# Patient Record
Sex: MALE | Race: WHITE | URBAN - METROPOLITAN AREA
[De-identification: names, ages, dates, MRNs, and addresses within clinical notes are randomized per-mention and may not be internally consistent; named-entity substitution may affect disease eponyms.]

---

## 2024-05-21 RX ORDER — ALFUZOSIN HYDROCHLORIDE 10 MG/1
10 TABLET, EXTENDED RELEASE ORAL NIGHTLY
COMMUNITY

## 2024-05-21 RX ORDER — BACLOFEN 20 MG/1
20 TABLET ORAL NIGHTLY
COMMUNITY

## 2024-05-21 RX ORDER — LEVOTHYROXINE SODIUM 0.03 MG/1
25 TABLET ORAL DAILY
COMMUNITY

## 2024-05-21 RX ORDER — ASPIRIN 81 MG/1
81 TABLET, CHEWABLE ORAL DAILY
COMMUNITY
End: 2024-05-21

## 2024-05-21 RX ORDER — VIBEGRON 75 MG/1
75 TABLET, FILM COATED ORAL DAILY
COMMUNITY
End: 2024-05-21

## 2024-05-21 RX ORDER — ASPIRIN 325 MG
325 TABLET ORAL DAILY
COMMUNITY

## 2024-05-21 NOTE — PROGRESS NOTES
[facility] PRE-ADMISSION TESTING INSTRUCTIONS    The Preadmission Testing patient is instructed accordingly using the following criteria (check applicable):    ARRIVAL INSTRUCTIONS:  [x] Parking the day of Surgery is located in the Main Entrance lot.  Upon entering the door, make an immediate right to the surgery reception desk    [x] Bring photo ID and insurance card    [] Bring in a copy of Living will or Durable Power of  papers.    [x] Please be sure to arrange for responsible adult to provide transportation to and from the hospital    [x] Please arrange for responsible adult to be with you for the 24 hour period post procedure due to having anesthesia    [x] If you awake am of surgery not feeling well or have temperature >100 please call [phone]    GENERAL INSTRUCTIONS:    [x] Nothing by mouth after midnight, including gum, candy, mints or water    [x] You may brush your teeth, but do not swallow any water    [] Take medications as instructed with 1-2 oz of water    [x] Stop herbal supplements and vitamins 5 days prior to procedure    [x] Follow preop dosing of blood thinners per physician instructions    [] Take 1/2 dose of evening insulin, but no insulin after midnight    [] No oral diabetic medications after midnight    [] If diabetic and have low blood sugar or feel symptomatic, take 1-2oz apple juice only    [] Bring inhalers day of surgery    [] Bring C-PAP/ Bi-Pap day of surgery    [] Bring urine specimen day of surgery    [x] Shower or bath with soap, lather and rinse well, AM of Surgery, no lotion, powders or creams to surgical site    [] Follow bowel prep as instructed per surgeon    [x] No tobacco products within 24 hours of surgery     [x] No alcohol or illegal drug use within 24 hours of surgery.    [x] Jewelry, body piercing's, eyeglasses, contact lenses and dentures are not permitted into surgery (bring cases)      [] Please do not wear any nail polish,

## 2024-05-22 ENCOUNTER — PREP FOR PROCEDURE (OUTPATIENT)
Dept: UROLOGY | Age: 69
End: 2024-05-22

## 2024-05-22 RX ORDER — SODIUM CHLORIDE 9 MG/ML
INJECTION, SOLUTION INTRAVENOUS CONTINUOUS
Status: CANCELLED | OUTPATIENT
Start: 2024-05-22

## 2024-05-22 RX ORDER — SODIUM CHLORIDE 0.9 % (FLUSH) 0.9 %
5-40 SYRINGE (ML) INJECTION EVERY 12 HOURS SCHEDULED
Status: CANCELLED | OUTPATIENT
Start: 2024-05-22

## 2024-05-22 RX ORDER — SODIUM CHLORIDE 9 MG/ML
INJECTION, SOLUTION INTRAVENOUS PRN
Status: CANCELLED | OUTPATIENT
Start: 2024-05-22

## 2024-05-22 RX ORDER — SODIUM CHLORIDE 0.9 % (FLUSH) 0.9 %
5-40 SYRINGE (ML) INJECTION PRN
Status: CANCELLED | OUTPATIENT
Start: 2024-05-22

## 2024-05-23 ENCOUNTER — ANESTHESIA EVENT (OUTPATIENT)
Dept: OPERATING ROOM | Age: 69
End: 2024-05-23
Payer: MEDICARE

## 2024-05-23 ENCOUNTER — HOSPITAL ENCOUNTER (INPATIENT)
Age: 69
LOS: 1 days | Discharge: HOME | DRG: 694 | End: 2024-05-24
Attending: UROLOGY | Admitting: UROLOGY
Payer: MEDICARE

## 2024-05-23 ENCOUNTER — ANESTHESIA (OUTPATIENT)
Dept: OPERATING ROOM | Age: 69
End: 2024-05-23
Payer: MEDICARE

## 2024-05-23 ENCOUNTER — APPOINTMENT (OUTPATIENT)
Dept: GENERAL RADIOLOGY | Age: 69
DRG: 694 | End: 2024-05-23
Attending: UROLOGY
Payer: MEDICARE

## 2024-05-23 DIAGNOSIS — R31.0 GROSS HEMATURIA: ICD-10-CM

## 2024-05-23 DIAGNOSIS — Z48.89 POSTOPERATIVE OBSERVATION: ICD-10-CM

## 2024-05-23 DIAGNOSIS — N21.0 BLADDER STONE: Primary | ICD-10-CM

## 2024-05-23 DIAGNOSIS — N21.0 CALCULUS IN DIVERTICULUM OF BLADDER: ICD-10-CM

## 2024-05-23 PROCEDURE — 2580000003 HC RX 258: Performed by: UROLOGY

## 2024-05-23 PROCEDURE — 6370000000 HC RX 637 (ALT 250 FOR IP): Performed by: UROLOGY

## 2024-05-23 PROCEDURE — 2580000003 HC RX 258: Performed by: NURSE ANESTHETIST, CERTIFIED REGISTERED

## 2024-05-23 PROCEDURE — 7100000001 HC PACU RECOVERY - ADDTL 15 MIN: Performed by: UROLOGY

## 2024-05-23 PROCEDURE — 3700000000 HC ANESTHESIA ATTENDED CARE: Performed by: UROLOGY

## 2024-05-23 PROCEDURE — 6360000002 HC RX W HCPCS: Performed by: UROLOGY

## 2024-05-23 PROCEDURE — BT141ZZ FLUOROSCOPY OF KIDNEYS, URETERS AND BLADDER USING LOW OSMOLAR CONTRAST: ICD-10-PCS | Performed by: UROLOGY

## 2024-05-23 PROCEDURE — 7100000011 HC PHASE II RECOVERY - ADDTL 15 MIN: Performed by: UROLOGY

## 2024-05-23 PROCEDURE — 3600000002 HC SURGERY LEVEL 2 BASE: Performed by: UROLOGY

## 2024-05-23 PROCEDURE — 6360000002 HC RX W HCPCS: Performed by: ANESTHESIOLOGY

## 2024-05-23 PROCEDURE — 2709999900 HC NON-CHARGEABLE SUPPLY: Performed by: UROLOGY

## 2024-05-23 PROCEDURE — 3600000012 HC SURGERY LEVEL 2 ADDTL 15MIN: Performed by: UROLOGY

## 2024-05-23 PROCEDURE — 88112 CYTOPATH CELL ENHANCE TECH: CPT

## 2024-05-23 PROCEDURE — 6360000002 HC RX W HCPCS

## 2024-05-23 PROCEDURE — 74420 UROGRAPHY RTRGR +-KUB: CPT

## 2024-05-23 PROCEDURE — 6360000002 HC RX W HCPCS: Performed by: NURSE ANESTHETIST, CERTIFIED REGISTERED

## 2024-05-23 PROCEDURE — 1200000000 HC SEMI PRIVATE

## 2024-05-23 PROCEDURE — 2580000003 HC RX 258

## 2024-05-23 PROCEDURE — G0378 HOSPITAL OBSERVATION PER HR: HCPCS

## 2024-05-23 PROCEDURE — 0TCB8ZZ EXTIRPATION OF MATTER FROM BLADDER, VIA NATURAL OR ARTIFICIAL OPENING ENDOSCOPIC: ICD-10-PCS | Performed by: UROLOGY

## 2024-05-23 PROCEDURE — 88300 SURGICAL PATH GROSS: CPT

## 2024-05-23 PROCEDURE — C1758 CATHETER, URETERAL: HCPCS | Performed by: UROLOGY

## 2024-05-23 PROCEDURE — 82365 CALCULUS SPECTROSCOPY: CPT

## 2024-05-23 PROCEDURE — 7100000010 HC PHASE II RECOVERY - FIRST 15 MIN: Performed by: UROLOGY

## 2024-05-23 PROCEDURE — 7100000000 HC PACU RECOVERY - FIRST 15 MIN: Performed by: UROLOGY

## 2024-05-23 PROCEDURE — 3700000001 HC ADD 15 MINUTES (ANESTHESIA): Performed by: UROLOGY

## 2024-05-23 PROCEDURE — 2060000000 HC ICU INTERMEDIATE R&B

## 2024-05-23 PROCEDURE — 2720000010 HC SURG SUPPLY STERILE: Performed by: UROLOGY

## 2024-05-23 PROCEDURE — 87086 URINE CULTURE/COLONY COUNT: CPT

## 2024-05-23 PROCEDURE — 2500000003 HC RX 250 WO HCPCS: Performed by: NURSE ANESTHETIST, CERTIFIED REGISTERED

## 2024-05-23 RX ORDER — DEXAMETHASONE SODIUM PHOSPHATE 4 MG/ML
INJECTION, SOLUTION INTRA-ARTICULAR; INTRALESIONAL; INTRAMUSCULAR; INTRAVENOUS; SOFT TISSUE PRN
Status: DISCONTINUED | OUTPATIENT
Start: 2024-05-23 | End: 2024-05-23 | Stop reason: SDUPTHER

## 2024-05-23 RX ORDER — DEXTROSE MONOHYDRATE 100 MG/ML
INJECTION, SOLUTION INTRAVENOUS CONTINUOUS PRN
Status: DISCONTINUED | OUTPATIENT
Start: 2024-05-23 | End: 2024-05-23 | Stop reason: HOSPADM

## 2024-05-23 RX ORDER — SODIUM CHLORIDE 9 MG/ML
INJECTION, SOLUTION INTRAVENOUS PRN
Status: DISCONTINUED | OUTPATIENT
Start: 2024-05-23 | End: 2024-05-23 | Stop reason: HOSPADM

## 2024-05-23 RX ORDER — BACLOFEN 10 MG/1
20 TABLET ORAL NIGHTLY
Status: DISCONTINUED | OUTPATIENT
Start: 2024-05-23 | End: 2024-05-24 | Stop reason: HOSPADM

## 2024-05-23 RX ORDER — CEPHALEXIN 500 MG/1
500 CAPSULE ORAL 2 TIMES DAILY
Qty: 10 CAPSULE | Refills: 0 | Status: SHIPPED | OUTPATIENT
Start: 2024-05-23 | End: 2024-05-28

## 2024-05-23 RX ORDER — OXYCODONE HYDROCHLORIDE AND ACETAMINOPHEN 5; 325 MG/1; MG/1
1 TABLET ORAL EVERY 6 HOURS PRN
Status: DISCONTINUED | OUTPATIENT
Start: 2024-05-23 | End: 2024-05-24 | Stop reason: HOSPADM

## 2024-05-23 RX ORDER — ONDANSETRON 4 MG/1
4 TABLET, FILM COATED ORAL EVERY 12 HOURS PRN
Qty: 10 TABLET | Refills: 0 | Status: SHIPPED | OUTPATIENT
Start: 2024-05-23

## 2024-05-23 RX ORDER — SODIUM CHLORIDE 9 MG/ML
INJECTION, SOLUTION INTRAVENOUS CONTINUOUS
Status: DISCONTINUED | OUTPATIENT
Start: 2024-05-23 | End: 2024-05-24 | Stop reason: HOSPADM

## 2024-05-23 RX ORDER — SODIUM CHLORIDE 9 MG/ML
INJECTION, SOLUTION INTRAVENOUS CONTINUOUS PRN
Status: DISCONTINUED | OUTPATIENT
Start: 2024-05-23 | End: 2024-05-23 | Stop reason: SDUPTHER

## 2024-05-23 RX ORDER — ONDANSETRON 2 MG/ML
4 INJECTION INTRAMUSCULAR; INTRAVENOUS EVERY 6 HOURS PRN
Status: DISCONTINUED | OUTPATIENT
Start: 2024-05-23 | End: 2024-05-24 | Stop reason: HOSPADM

## 2024-05-23 RX ORDER — HYDROCODONE BITARTRATE AND ACETAMINOPHEN 5; 325 MG/1; MG/1
1 TABLET ORAL EVERY 4 HOURS PRN
Qty: 10 TABLET | Refills: 0 | Status: SHIPPED | OUTPATIENT
Start: 2024-05-23 | End: 2024-05-28

## 2024-05-23 RX ORDER — PHENAZOPYRIDINE HYDROCHLORIDE 200 MG/1
200 TABLET, FILM COATED ORAL 2 TIMES DAILY PRN
Qty: 10 TABLET | Refills: 0 | Status: SHIPPED | OUTPATIENT
Start: 2024-05-23

## 2024-05-23 RX ORDER — LEVOTHYROXINE SODIUM 0.03 MG/1
25 TABLET ORAL DAILY
Status: DISCONTINUED | OUTPATIENT
Start: 2024-05-23 | End: 2024-05-24 | Stop reason: HOSPADM

## 2024-05-23 RX ORDER — DIPHENHYDRAMINE HYDROCHLORIDE 50 MG/ML
12.5 INJECTION INTRAMUSCULAR; INTRAVENOUS
Status: DISCONTINUED | OUTPATIENT
Start: 2024-05-23 | End: 2024-05-23 | Stop reason: HOSPADM

## 2024-05-23 RX ORDER — SODIUM CHLORIDE 9 MG/ML
INJECTION, SOLUTION INTRAVENOUS CONTINUOUS
Status: DISCONTINUED | OUTPATIENT
Start: 2024-05-23 | End: 2024-05-23 | Stop reason: HOSPADM

## 2024-05-23 RX ORDER — SODIUM CHLORIDE 0.9 % (FLUSH) 0.9 %
5-40 SYRINGE (ML) INJECTION EVERY 12 HOURS SCHEDULED
Status: DISCONTINUED | OUTPATIENT
Start: 2024-05-23 | End: 2024-05-24 | Stop reason: HOSPADM

## 2024-05-23 RX ORDER — SODIUM CHLORIDE 0.9 % (FLUSH) 0.9 %
5-40 SYRINGE (ML) INJECTION PRN
Status: DISCONTINUED | OUTPATIENT
Start: 2024-05-23 | End: 2024-05-24 | Stop reason: HOSPADM

## 2024-05-23 RX ORDER — MIDAZOLAM HYDROCHLORIDE 1 MG/ML
INJECTION INTRAMUSCULAR; INTRAVENOUS
Status: DISCONTINUED
Start: 2024-05-23 | End: 2024-05-23 | Stop reason: WASHOUT

## 2024-05-23 RX ORDER — OXYCODONE HYDROCHLORIDE AND ACETAMINOPHEN 5; 325 MG/1; MG/1
1 TABLET ORAL EVERY 6 HOURS PRN
Status: DISCONTINUED | OUTPATIENT
Start: 2024-05-23 | End: 2024-05-23 | Stop reason: ALTCHOICE

## 2024-05-23 RX ORDER — PROPOFOL 10 MG/ML
INJECTION, EMULSION INTRAVENOUS CONTINUOUS PRN
Status: DISCONTINUED | OUTPATIENT
Start: 2024-05-23 | End: 2024-05-23 | Stop reason: SDUPTHER

## 2024-05-23 RX ORDER — PROCHLORPERAZINE EDISYLATE 5 MG/ML
5 INJECTION INTRAMUSCULAR; INTRAVENOUS
Status: DISCONTINUED | OUTPATIENT
Start: 2024-05-23 | End: 2024-05-23 | Stop reason: HOSPADM

## 2024-05-23 RX ORDER — ONDANSETRON 2 MG/ML
INJECTION INTRAMUSCULAR; INTRAVENOUS PRN
Status: DISCONTINUED | OUTPATIENT
Start: 2024-05-23 | End: 2024-05-23 | Stop reason: SDUPTHER

## 2024-05-23 RX ORDER — TAMSULOSIN HYDROCHLORIDE 0.4 MG/1
0.4 CAPSULE ORAL DAILY
Status: DISCONTINUED | OUTPATIENT
Start: 2024-05-23 | End: 2024-05-24 | Stop reason: HOSPADM

## 2024-05-23 RX ORDER — ONDANSETRON 2 MG/ML
4 INJECTION INTRAMUSCULAR; INTRAVENOUS
Status: DISCONTINUED | OUTPATIENT
Start: 2024-05-23 | End: 2024-05-23 | Stop reason: HOSPADM

## 2024-05-23 RX ORDER — FENTANYL CITRATE 50 UG/ML
25 INJECTION, SOLUTION INTRAMUSCULAR; INTRAVENOUS EVERY 5 MIN PRN
Status: COMPLETED | OUTPATIENT
Start: 2024-05-23 | End: 2024-05-23

## 2024-05-23 RX ORDER — LIDOCAINE HYDROCHLORIDE 20 MG/ML
INJECTION, SOLUTION EPIDURAL; INFILTRATION; INTRACAUDAL; PERINEURAL PRN
Status: DISCONTINUED | OUTPATIENT
Start: 2024-05-23 | End: 2024-05-23 | Stop reason: SDUPTHER

## 2024-05-23 RX ORDER — SODIUM CHLORIDE 9 MG/ML
INJECTION, SOLUTION INTRAVENOUS PRN
Status: DISCONTINUED | OUTPATIENT
Start: 2024-05-23 | End: 2024-05-24 | Stop reason: HOSPADM

## 2024-05-23 RX ORDER — FENTANYL CITRATE 50 UG/ML
INJECTION, SOLUTION INTRAMUSCULAR; INTRAVENOUS PRN
Status: DISCONTINUED | OUTPATIENT
Start: 2024-05-23 | End: 2024-05-23 | Stop reason: SDUPTHER

## 2024-05-23 RX ORDER — OXYBUTYNIN CHLORIDE 5 MG/1
5 TABLET ORAL
Status: COMPLETED | OUTPATIENT
Start: 2024-05-23 | End: 2024-05-23

## 2024-05-23 RX ORDER — NALOXONE HYDROCHLORIDE 0.4 MG/ML
INJECTION, SOLUTION INTRAMUSCULAR; INTRAVENOUS; SUBCUTANEOUS PRN
Status: DISCONTINUED | OUTPATIENT
Start: 2024-05-23 | End: 2024-05-23 | Stop reason: HOSPADM

## 2024-05-23 RX ORDER — SODIUM CHLORIDE 0.9 % (FLUSH) 0.9 %
5-40 SYRINGE (ML) INJECTION PRN
Status: DISCONTINUED | OUTPATIENT
Start: 2024-05-23 | End: 2024-05-23 | Stop reason: HOSPADM

## 2024-05-23 RX ORDER — IPRATROPIUM BROMIDE AND ALBUTEROL SULFATE 2.5; .5 MG/3ML; MG/3ML
1 SOLUTION RESPIRATORY (INHALATION)
Status: DISCONTINUED | OUTPATIENT
Start: 2024-05-23 | End: 2024-05-23 | Stop reason: HOSPADM

## 2024-05-23 RX ORDER — SODIUM CHLORIDE 0.9 % (FLUSH) 0.9 %
5-40 SYRINGE (ML) INJECTION EVERY 12 HOURS SCHEDULED
Status: DISCONTINUED | OUTPATIENT
Start: 2024-05-23 | End: 2024-05-23 | Stop reason: HOSPADM

## 2024-05-23 RX ORDER — GLUCAGON 1 MG/ML
1 KIT INJECTION PRN
Status: DISCONTINUED | OUTPATIENT
Start: 2024-05-23 | End: 2024-05-23 | Stop reason: HOSPADM

## 2024-05-23 RX ADMIN — WATER 2000 MG: 1 INJECTION INTRAMUSCULAR; INTRAVENOUS; SUBCUTANEOUS at 07:05

## 2024-05-23 RX ADMIN — ONDANSETRON 4 MG: 2 INJECTION INTRAMUSCULAR; INTRAVENOUS at 07:05

## 2024-05-23 RX ADMIN — DEXAMETHASONE SODIUM PHOSPHATE 8 MG: 4 INJECTION, SOLUTION INTRAMUSCULAR; INTRAVENOUS at 07:05

## 2024-05-23 RX ADMIN — SODIUM CHLORIDE: 9 INJECTION, SOLUTION INTRAVENOUS at 11:51

## 2024-05-23 RX ADMIN — SODIUM CHLORIDE: 9 INJECTION, SOLUTION INTRAVENOUS at 06:55

## 2024-05-23 RX ADMIN — WATER 1000 MG: 1 INJECTION INTRAMUSCULAR; INTRAVENOUS; SUBCUTANEOUS at 16:06

## 2024-05-23 RX ADMIN — PROPOFOL 180 MCG/KG/MIN: 10 INJECTION, EMULSION INTRAVENOUS at 07:02

## 2024-05-23 RX ADMIN — SODIUM CHLORIDE, PRESERVATIVE FREE 10 ML: 5 INJECTION INTRAVENOUS at 11:56

## 2024-05-23 RX ADMIN — FENTANYL CITRATE 25 MCG: 50 INJECTION INTRAMUSCULAR; INTRAVENOUS at 08:39

## 2024-05-23 RX ADMIN — FENTANYL CITRATE 50 MCG: 50 INJECTION, SOLUTION INTRAMUSCULAR; INTRAVENOUS at 07:42

## 2024-05-23 RX ADMIN — BACLOFEN 20 MG: 10 TABLET ORAL at 21:36

## 2024-05-23 RX ADMIN — TAMSULOSIN HYDROCHLORIDE 0.4 MG: 0.4 CAPSULE ORAL at 13:50

## 2024-05-23 RX ADMIN — OXYCODONE HYDROCHLORIDE AND ACETAMINOPHEN 1 TABLET: 5; 325 TABLET ORAL at 10:10

## 2024-05-23 RX ADMIN — FENTANYL CITRATE 50 MCG: 50 INJECTION, SOLUTION INTRAMUSCULAR; INTRAVENOUS at 07:02

## 2024-05-23 RX ADMIN — FENTANYL CITRATE 50 MCG: 50 INJECTION, SOLUTION INTRAMUSCULAR; INTRAVENOUS at 07:18

## 2024-05-23 RX ADMIN — FENTANYL CITRATE 25 MCG: 50 INJECTION INTRAMUSCULAR; INTRAVENOUS at 08:47

## 2024-05-23 RX ADMIN — OXYCODONE HYDROCHLORIDE AND ACETAMINOPHEN 1 TABLET: 5; 325 TABLET ORAL at 16:17

## 2024-05-23 RX ADMIN — OXYBUTYNIN CHLORIDE 5 MG: 5 TABLET ORAL at 08:28

## 2024-05-23 RX ADMIN — LEVOTHYROXINE SODIUM 25 MCG: 25 TABLET ORAL at 13:50

## 2024-05-23 RX ADMIN — SODIUM CHLORIDE: 9 INJECTION, SOLUTION INTRAVENOUS at 15:04

## 2024-05-23 RX ADMIN — LIDOCAINE HYDROCHLORIDE 40 MG: 20 INJECTION, SOLUTION EPIDURAL; INFILTRATION; INTRACAUDAL; PERINEURAL at 07:02

## 2024-05-23 ASSESSMENT — PAIN DESCRIPTION - DESCRIPTORS
DESCRIPTORS: PRESSURE
DESCRIPTORS: DISCOMFORT
DESCRIPTORS: POUNDING;PRESSURE
DESCRIPTORS: DISCOMFORT
DESCRIPTORS: POUNDING;PRESSURE

## 2024-05-23 ASSESSMENT — PAIN SCALES - GENERAL
PAINLEVEL_OUTOF10: 7
PAINLEVEL_OUTOF10: 10
PAINLEVEL_OUTOF10: 5
PAINLEVEL_OUTOF10: 5
PAINLEVEL_OUTOF10: 4
PAINLEVEL_OUTOF10: 10
PAINLEVEL_OUTOF10: 7

## 2024-05-23 ASSESSMENT — PAIN - FUNCTIONAL ASSESSMENT
PAIN_FUNCTIONAL_ASSESSMENT: 0-10
PAIN_FUNCTIONAL_ASSESSMENT: 0-10

## 2024-05-23 ASSESSMENT — PAIN DESCRIPTION - FREQUENCY
FREQUENCY: CONTINUOUS

## 2024-05-23 ASSESSMENT — PAIN DESCRIPTION - ORIENTATION
ORIENTATION: OTHER (COMMENT)
ORIENTATION: LOWER

## 2024-05-23 ASSESSMENT — PAIN DESCRIPTION - PAIN TYPE
TYPE: SURGICAL PAIN

## 2024-05-23 ASSESSMENT — PAIN DESCRIPTION - ONSET
ONSET: ON-GOING
ONSET: ON-GOING

## 2024-05-23 ASSESSMENT — PAIN DESCRIPTION - LOCATION
LOCATION: ABDOMEN;PENIS
LOCATION: PENIS;PERINEUM
LOCATION: PENIS

## 2024-05-23 NOTE — INTERVAL H&P NOTE
Update History & Physical    The patient's History and Physical of today,  was reviewed with the patient and I examined the patient. There was no change in the plan. The surgical site was confirmed by the patient and me.     Plan: The risks, benefits, expected outcome, and alternative to the recommended procedure have been discussed with the patient. Patient understands and wants to proceed with the procedure.     Electronically signed by Hudson Thrasher MD on 5/23/2024 at 6:56 AM

## 2024-05-23 NOTE — DISCHARGE INSTRUCTIONS
May see blood in your urine you may have burning with urination.  The catheter can be removed tomorrow.  Come to our office in [facility] at 9:30 AM tomorrow  If you get severe abdominal pain fever chills please call us at [phone] which is the medical [facility] number or in the daytime call [phone] our office number

## 2024-05-23 NOTE — PROGRESS NOTES
Patient c/o pressure and bleeding around catheter site. Catheter irrigated with 150 cc sterile water. Several small clots removed. Patient tolerated well. Catheter patent and draining.

## 2024-05-23 NOTE — PROGRESS NOTES
0940 pt c/o pain and discomfort at meatus with catheter. Irrigated with 50 cc sterile water, returned back 50 cc pink tinged urine. Pt stated he wants to stay over night  0950 Dr Thrasher here to see pt. Okay for pt stay overnight

## 2024-05-23 NOTE — BRIEF OP NOTE
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]     * Gross hematuria [R31.0] assess upper tract for obstruction    Post-Op Diagnosis: Normal upper tract bladder calculus       Procedure: Cystoscopy bilateral retrograde pyelograms cystolitholopaxy insertion of Kowalski exam under anesthesia    Surgeon(s):  Hudson Thrasher MD    Assistant:  None    Anesthesia: Monitor Anesthesia Care    Estimated Blood Loss (mL): Less than 10 cc    Complications: None    Specimens:   Bladder urine for culture and cytology/bladder stone specimen    Implants:  None      Drains: 22 Turkish three-way Kowalski cath    Findings:  Infection Present At Time Of Surgery (PATOS) (choose all levels that have infection present):    Other Findings:     Electronically signed by Hudson Thrasher MD on 5/23/2024 at 6:57 AM

## 2024-05-23 NOTE — OP NOTE
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    POSTOPERATIVE DIAGNOSES:  Normal upper tracts, a bladder calculus.  No urachal cyst abnormalities in the dome of the bladder.    OPERATION:  Cystopanendoscopy, bilateral retrograde pyelogram, cystolitholapaxy, insertion of Kowalski, exam under anesthesia.    BLOOD LOSS:  In this case was less than 10 mL.    COMPLICATIONS:  None.    SPECIMENS:  Bladder urine sent for culture and cytology and the bladder stone was sent for specimen.    DESCRIPTION OF PROCEDURE:  The time-out was read by me to Anesthesia and operating staff.  Reviewed history and physical, allergy, and medication, all in agreement.  2 g of Ancef upon induction.  Placed in lithotomy position.  No undue tension on hips, knees, or buttocks.  The Profore compression devices were functional throughout.  The 21 panendoscope with obturator inserted in the bladder.  Urine obtained for culture.  Careful examination of bladder with the 70 and 30-degree lens demonstrated no lesions, particularly in the dome.  There were no mucosal lesions compatible with serious, inflammatory, premalignant, or obviously malignant disease.  On CAT scan, this patient had what appeared to be urachal cyst.  I wanted to make sure there was no tumor or calculus in the dome of the bladder and there were none.  The bladder stone was about 17 mm in greatest dimension as seen on the CAT scan.  There appeared to be a very dense and smooth stone.  The   film of the abdomen showed a bladder stone, showed no abnormalities.  Retrograde pyelogram demonstrated no intrinsic or extrinsic abnormalities in ureters, the UPJ, the infundibulum, or the calyces.  There was a pelvic density, was outside the ureter on the left, and it was a phlebolith.  These were normal upper tracts.  So 1000 laser fiber with dusting on a setting of 2.5 and a fracture also at 2.5, this stone was fractured.  There was no bladder perforation.  There were some large fragments, I actually had to pull these out through the urethra and there was some abrasion of the urethra.  There was some bleeding, but there was no perforation of the urethra.  At the end, all fragments had been removed.  A 22-Chinese 3-way catheter was inserted and the irrigant was clear.  Postoperatively, in recovery, the patient had urethral bleeding around the catheter, which appeared to be perhaps as much as 10 mL, but the irrigant was a very slight pink without clots.  The patient did have discomfort and eventually was admitted.  His rectal exam demonstrated a prostate approximately 30 to 35 g size without nodules.  He may at some point need a transurethral resection or transurethral incision of the prostate to promote bladder emptying and recurrent stones.  The 3-way catheter was functional and was sent to recovery in satisfactory condition.  Blood loss as mentioned less than 10 mL.          HUDSON THRASHER MD      D:  05/23/2024 11:07:41     T:  05/23/2024 12:14:51     SYLVIA/MILAGROS  Job #:  [id number]     Doc#:  [id number]    CC:   Hudson Thrasher MD

## 2024-05-23 NOTE — ANESTHESIA POSTPROCEDURE EVALUATION
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])      (Gross hematuria [R31.0])    Surgeons: Hudson Thrasher MD Responsible Provider: Corky Serra DO    Anesthesia Type: MAC ASA Status: 2            Anesthesia Type: MAC    Darrel Phase I: Darrel Score: 10    Darrel Phase II:      Anesthesia Post Evaluation    Patient location during evaluation: PACU  Patient participation: complete - patient participated  Level of consciousness: sleepy but conscious  Pain score: 0  Airway patency: patent  Nausea & Vomiting: no vomiting and no nausea  Cardiovascular status: hemodynamically stable and tachycardic  Respiratory status: acceptable and room air  Hydration status: stable  Comments: Patient was in afib at a rate of 110's to 120s.  He went up into the 130's during the procedure and stayed there despite being given labetalol to try and slow his rate down.  He continued to be in the 130's during his PACU stay and cardiology was consulted due to this condition.       No notable events documented.

## 2024-05-23 NOTE — ANESTHESIA PRE PROCEDURE
Department of Anesthesiology  Preprocedure Note       Name:  Antony Kee   Age:  68 y.o.  :  1955                                          MRN:  [id number]         Date:  2024      Surgeon: Surgeon(s):  Hudson Thrasher MD    Procedure: Procedure(s):  CYSTOSCOPY BILATERAL RETROGRADE PYELOGRAM CYSTOLITHOLAPAXY BLADDER BIOPSY FULGURATION    Medications prior to admission:   Prior to Admission medications    Medication Sig Start Date End Date Taking? Authorizing Provider   alfuzosin (UROXATRAL) 10 MG extended release tablet Take 1 tablet by mouth at bedtime   Yes ProviderSyd MD   aspirin 325 MG tablet Take 1 tablet by mouth daily   Yes ProviderSyd MD   baclofen (LIORESAL) 20 MG tablet Take 1 tablet by mouth nightly   Yes Syd Brown MD   levothyroxine (SYNTHROID) 25 MCG tablet Take 1 tablet by mouth Daily   Yes ProviderSyd MD       Current medications:    Current Facility-Administered Medications   Medication Dose Route Frequency Provider Last Rate Last Admin    sodium chloride flush 0.9 % injection 5-40 mL  5-40 mL IntraVENous 2 times per day Justin Garibay APRN - CNP        sodium chloride flush 0.9 % injection 5-40 mL  5-40 mL IntraVENous PRN Justin Garibay APRN - CNP        0.9 % sodium chloride infusion   IntraVENous PRN Justin Garibay APRN - CNP        0.9 % sodium chloride infusion   IntraVENous Continuous Justin Garibay APRN - CNP        ceFAZolin (ANCEF) 2,000 mg in sterile water 20 mL IV syringe  2,000 mg IntraVENous On Call to OR Justin Garibay APRN - CNP           Allergies:  No Known Allergies    Problem List:  There is no problem list on file for this patient.      Past Medical History:        Diagnosis Date    BPH (benign prostatic hyperplasia)     Hx of blood clots     superficial left calf    Hyperlipidemia     diet controlled    Hypertension     \"only slightly\" per pt    Thyroid disease     Urinary frequency        Past Surgical

## 2024-05-23 NOTE — PROGRESS NOTES
4 Eyes Skin Assessment     NAME:  Antony Kee  YOB: 1955  MEDICAL RECORD NUMBER:  [id number]    The patient is being assessed for  Admission    I agree that at least one RN has performed a thorough Head to Toe Skin Assessment on the patient. ALL assessment sites listed below have been assessed.      Areas assessed by both nurses:    Head, Face, Ears, Shoulders, Back, Chest, Arms, Elbows, Hands, Sacrum. Buttock, Coccyx, Ischium, Legs. Feet and Heels, and Under Medical Devices         Does the Patient have a Wound? No noted wound(s)       Ej Prevention initiated by RN: Yes  Wound Care Orders initiated by RN: No    Pressure Injury (Stage 3,4, Unstageable, DTI, NWPT, and Complex wounds) if present, place Wound referral order by RN under : No    New Ostomies, if present place, Ostomy referral order under : No     Nurse 1 eSignature: Electronically signed by Phyllis Beharry, RN on 5/23/24 at 4:06 PM EDT    **SHARE this note so that the co-signing nurse can place an eSignature**    Nurse 2 eSignature: {Esignature:[id number]}

## 2024-05-23 NOTE — H&P
5/23/2024 6:52 AM  Service: Urology  Group: [facility] ([facility])    Antony Kee  [id number]     Chief Complaint: Gross hematuria    History of Present Illness:  The patient is a 68 y.o. male patient who presents with history of recent gross hematuria.  He had a CAT scan which demonstrated calcification in his bladder about 17 mm there is also calcification of the dome suggestive of a possible urachal remnant this has to be assessed to be sure there is no associated adenocarcinoma of the bladder which can occur in urachal remnants.  Patient has a long history of frequency urgency incomplete bladder emptying with alpha-blocker and an overactive bladder treatment.  He also had had hematospermia at 1 point which is not a problem at this time and the past history is no history of smoking or no high caffeine intake no family history of prostate cancer.  He has a 3 mm stone in his right kidney which is nonobstructing advised him no need for ureteroscopy or treatment of the stone at this time.  Patient's last PSA in October 7, 2023 was 0.50.  He continues to have frequency about every 2 hours with nocturia x 4.  He has been on baby aspirin which he has stopped.  He continues to take alfuzosin 10 mg a day.  He is also taking baclofen at night to help cut down on his frequency as well as Gemtesa 75 mg daily for his overactive bladder symptoms.    Past Medical History:   Diagnosis Date    BPH (benign prostatic hyperplasia)     Hx of blood clots 2023    superficial left calf    Hyperlipidemia     diet controlled    Hypertension     \"only slightly\" per pt    Thyroid disease     Urinary frequency        Past Surgical History:   Procedure Laterality Date    COLONOSCOPY  2016    CYSTOSCOPY      HIP FRACTURE SURGERY Right 2020    KNEE ARTHROSCOPY Right 2022    WRIST SURGERY         Medications Prior to Admission:    Medications Prior to Admission: alfuzosin (UROXATRAL) 10 MG extended release tablet, Take 1 tablet by

## 2024-05-24 VITALS
SYSTOLIC BLOOD PRESSURE: 118 MMHG | WEIGHT: 165 LBS | BODY MASS INDEX: 27.49 KG/M2 | TEMPERATURE: 98 F | RESPIRATION RATE: 16 BRPM | OXYGEN SATURATION: 100 % | SYSTOLIC BLOOD PRESSURE: 118 MMHG | DIASTOLIC BLOOD PRESSURE: 70 MMHG | HEIGHT: 65 IN | HEART RATE: 56 BPM | HEART RATE: 56 BPM | RESPIRATION RATE: 16 BRPM | OXYGEN SATURATION: 100 % | TEMPERATURE: 98 F | BODY MASS INDEX: 27.49 KG/M2 | WEIGHT: 165 LBS | HEIGHT: 65 IN | DIASTOLIC BLOOD PRESSURE: 70 MMHG

## 2024-05-24 LAB
BASOPHILS # BLD: 0.01 K/UL (ref 0–0.2)
BASOPHILS # BLD: 0.01 K/UL (ref 0–0.2)
BASOPHILS NFR BLD: 0 % (ref 0–2)
BASOPHILS NFR BLD: 0 % (ref 0–2)
EOSINOPHIL # BLD: 0 K/UL (ref 0.05–0.5)
EOSINOPHIL # BLD: 0 K/UL (ref 0.05–0.5)
EOSINOPHILS RELATIVE PERCENT: 0 % (ref 0–6)
EOSINOPHILS RELATIVE PERCENT: 0 % (ref 0–6)
ERYTHROCYTE [DISTWIDTH] IN BLOOD BY AUTOMATED COUNT: 13.3 % (ref 11.5–15)
ERYTHROCYTE [DISTWIDTH] IN BLOOD BY AUTOMATED COUNT: 13.3 % (ref 11.5–15)
HCT VFR BLD AUTO: 39.4 % (ref 37–54)
HCT VFR BLD AUTO: 39.4 % (ref 37–54)
HGB BLD-MCNC: 12.9 G/DL (ref 12.5–16.5)
HGB BLD-MCNC: 12.9 G/DL (ref 12.5–16.5)
IMM GRANULOCYTES # BLD AUTO: 0.06 K/UL (ref 0–0.58)
IMM GRANULOCYTES # BLD AUTO: 0.06 K/UL (ref 0–0.58)
IMM GRANULOCYTES NFR BLD: 0 % (ref 0–5)
IMM GRANULOCYTES NFR BLD: 0 % (ref 0–5)
LYMPHOCYTES NFR BLD: 1.02 K/UL (ref 1.5–4)
LYMPHOCYTES NFR BLD: 1.02 K/UL (ref 1.5–4)
LYMPHOCYTES RELATIVE PERCENT: 8 % (ref 20–42)
LYMPHOCYTES RELATIVE PERCENT: 8 % (ref 20–42)
MCH RBC QN AUTO: 29.7 PG (ref 26–35)
MCH RBC QN AUTO: 29.7 PG (ref 26–35)
MCHC RBC AUTO-ENTMCNC: 32.7 G/DL (ref 32–34.5)
MCHC RBC AUTO-ENTMCNC: 32.7 G/DL (ref 32–34.5)
MCV RBC AUTO: 90.6 FL (ref 80–99.9)
MCV RBC AUTO: 90.6 FL (ref 80–99.9)
MICROORGANISM SPEC CULT: NO GROWTH
MICROORGANISM SPEC CULT: NO GROWTH
MONOCYTES NFR BLD: 1.24 K/UL (ref 0.1–0.95)
MONOCYTES NFR BLD: 1.24 K/UL (ref 0.1–0.95)
MONOCYTES NFR BLD: 9 % (ref 2–12)
MONOCYTES NFR BLD: 9 % (ref 2–12)
NEUTROPHILS NFR BLD: 83 % (ref 43–80)
NEUTROPHILS NFR BLD: 83 % (ref 43–80)
NEUTS SEG NFR BLD: 11.32 K/UL (ref 1.8–7.3)
NEUTS SEG NFR BLD: 11.32 K/UL (ref 1.8–7.3)
NON-GYN CYTOLOGY REPORT: NORMAL
NON-GYN CYTOLOGY REPORT: NORMAL
PLATELET # BLD AUTO: 192 K/UL (ref 130–450)
PLATELET # BLD AUTO: 192 K/UL (ref 130–450)
PMV BLD AUTO: 10.6 FL (ref 7–12)
PMV BLD AUTO: 10.6 FL (ref 7–12)
RBC # BLD AUTO: 4.35 M/UL (ref 3.8–5.8)
RBC # BLD AUTO: 4.35 M/UL (ref 3.8–5.8)
SPECIMEN DESCRIPTION: NORMAL
SPECIMEN DESCRIPTION: NORMAL
WBC OTHER # BLD: 13.7 K/UL (ref 4.5–11.5)
WBC OTHER # BLD: 13.7 K/UL (ref 4.5–11.5)

## 2024-05-24 PROCEDURE — 85025 COMPLETE CBC W/AUTO DIFF WBC: CPT

## 2024-05-24 PROCEDURE — 6370000000 HC RX 637 (ALT 250 FOR IP): Performed by: UROLOGY

## 2024-05-24 PROCEDURE — 6360000002 HC RX W HCPCS: Performed by: UROLOGY

## 2024-05-24 PROCEDURE — 2580000003 HC RX 258: Performed by: UROLOGY

## 2024-05-24 PROCEDURE — G0378 HOSPITAL OBSERVATION PER HR: HCPCS

## 2024-05-24 RX ORDER — OXYCODONE HYDROCHLORIDE AND ACETAMINOPHEN 5; 325 MG/1; MG/1
1 TABLET ORAL EVERY 6 HOURS PRN
Qty: 6 TABLET | Refills: 0 | Status: SHIPPED | OUTPATIENT
Start: 2024-05-24 | End: 2024-05-24 | Stop reason: HOSPADM

## 2024-05-24 RX ADMIN — OXYCODONE HYDROCHLORIDE AND ACETAMINOPHEN 1 TABLET: 5; 325 TABLET ORAL at 06:44

## 2024-05-24 RX ADMIN — WATER 1000 MG: 1 INJECTION INTRAMUSCULAR; INTRAVENOUS; SUBCUTANEOUS at 08:35

## 2024-05-24 RX ADMIN — TAMSULOSIN HYDROCHLORIDE 0.4 MG: 0.4 CAPSULE ORAL at 08:33

## 2024-05-24 RX ADMIN — LEVOTHYROXINE SODIUM 25 MCG: 25 TABLET ORAL at 06:44

## 2024-05-24 RX ADMIN — WATER 1000 MG: 1 INJECTION INTRAMUSCULAR; INTRAVENOUS; SUBCUTANEOUS at 00:24

## 2024-05-24 RX ADMIN — OXYCODONE HYDROCHLORIDE AND ACETAMINOPHEN 1 TABLET: 5; 325 TABLET ORAL at 00:25

## 2024-05-24 ASSESSMENT — PAIN DESCRIPTION - LOCATION
LOCATION: PENIS
LOCATION: PENIS

## 2024-05-24 ASSESSMENT — PAIN SCALES - GENERAL
PAINLEVEL_OUTOF10: 0
PAINLEVEL_OUTOF10: 3
PAINLEVEL_OUTOF10: 4
PAINLEVEL_OUTOF10: 4

## 2024-05-24 ASSESSMENT — PAIN - FUNCTIONAL ASSESSMENT
PAIN_FUNCTIONAL_ASSESSMENT: ACTIVITIES ARE NOT PREVENTED
PAIN_FUNCTIONAL_ASSESSMENT: ACTIVITIES ARE NOT PREVENTED

## 2024-05-24 ASSESSMENT — PAIN DESCRIPTION - FREQUENCY: FREQUENCY: INTERMITTENT

## 2024-05-24 ASSESSMENT — PAIN DESCRIPTION - ONSET: ONSET: GRADUAL

## 2024-05-24 ASSESSMENT — PAIN DESCRIPTION - ORIENTATION
ORIENTATION: INNER
ORIENTATION: INNER

## 2024-05-24 ASSESSMENT — PAIN DESCRIPTION - DESCRIPTORS
DESCRIPTORS: DISCOMFORT;PRESSURE;SORE;TENDER
DESCRIPTORS: DISCOMFORT;PRESSURE;SORE;TENDER

## 2024-05-24 ASSESSMENT — PAIN DESCRIPTION - PAIN TYPE: TYPE: ACUTE PAIN;SURGICAL PAIN

## 2024-05-24 NOTE — DISCHARGE SUMMARY
Discharge Summary    Date: 5/24/2024  Patient Name: Antony Kee    YOB: 1955     Age: 68 y.o.    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 *      Hospital Stay  Narrative of Hospital Course:  Patient presented to the hospital for elective procedure due to recurrent gross hematuria.  He was scheduled for and underwent a cystopanendoscopy, bilateral retrograde pyelogram, cystolitholapaxy, insertion of Kowalski, exam under anesthesia.  He tolerated the procedure well but was admitted to the hospital for bleeding around the catheter and discomfort.  On the day of discharge he was awake and alert in stable condition lying in bed with no evidence of blood around the catheter.  He also had no evidence of hematuria in the catheter    Consultants:  None    Surgeries/procedures Performed:  Cystopanendoscopy, bilateral retrograde pyelogram, cystolitholapaxy, insertion of Kowalski, exam under anesthesia     Treatments:            Discharge Plan/Disposition:  Home    Hospital/Incidental Findings Requiring Follow Up:    Patient Instructions:    Diet: Regular Diet    Activity:Activity as Tolerated  For number of days (if applicable):      Other Instructions: He will be discharged home with a Kowalski catheter with a leg bag and instructed to follow-up in office early next week for voiding trial    Provider Follow-Up:   No follow-ups on file.     Significant Diagnostic Studies:    Recent Labs:  Admission on 05/23/2024  Specimen Description                          Date: 05/23/2024  Value: .CLEAN CATCH URINE                       Status: Final  Culture                                       Date:                 Date: 05/24/2024  Value: 32.7        Ref range: 32.0 - 34.5 g/dL   Status: Final  RDW                                           Date: 05/24/2024  Value: 13.3        Ref range: 11.5 - 15.0 %      Status: Final  Platelets                                     Date: 05/24/2024  Value: 192         Ref range: 130 - 450 k/uL     Status: Final  MPV                                           Date: 05/24/2024  Value: 10.6        Ref range: 7.0 - 12.0 fL      Status: Final  Neutrophils %                                 Date: 05/24/2024  Value: 83 (H)      Ref range: 43.0 - 80.0 %      Status: Final  Lymphocytes %                                 Date: 05/24/2024  Value: 8 (L)       Ref range: 20.0 - 42.0 %      Status: Final  Monocytes %                                   Date: 05/24/2024  Value: 9           Ref range: 2.0 - 12.0 %       Status: Final  Eosinophils %                                 Date: 05/24/2024  Value: 0           Ref range: 0 - 6 %            Status: Final  Basophils %                                   Date: 05/24/2024  Value: 0           Ref range: 0.0 - 2.0 %        Status: Final  Immature Granulocytes %                       Date: 05/24/2024  Value: 0           Ref range: 0.0 - 5.0 %        Status: Final  Neutrophils Absolute                          Date: 05/24/2024  Value: 11.32 (H)   Ref range: 1.80 - 7.30 k/uL   Status: Final  Lymphocytes Absolute                          Date: 05/24/2024  Value: 1.02 (L)    Ref range: 1.50 - 4.00 k/uL   Status: Final  Monocytes Absolute                            Date: 05/24/2024  Value: 1.24 (H)    Ref range: 0.10 - 0.95 k/uL   Status: Final  Eosinophils Absolute                          Date: 05/24/2024  Value: 0.00 (L)    Ref range: 0.05 - 0.50 k/uL   Status: Final  Basophils Absolute                            Date: 05/24/2024  Value: 0.01        Ref range: 0.00 - 0.20 k/uL   Status: Final  Immature Granulocytes Absolute                Date:

## 2024-05-24 NOTE — PLAN OF CARE
Problem: Discharge Planning  Goal: Discharge to home or other facility with appropriate resources  5/24/2024 1306 by Car Crespo, RN  Outcome: Adequate for Discharge  5/24/2024 0131 by Lourdes Felipe RN  Outcome: Progressing  Flowsheets (Taken 5/23/2024 2015)  Discharge to home or other facility with appropriate resources: Identify barriers to discharge with patient and caregiver     Problem: Pain  Goal: Verbalizes/displays adequate comfort level or baseline comfort level  5/24/2024 1306 by Car Crespo, PARRISH  Outcome: Adequate for Discharge  5/24/2024 0131 by Lourdes Felipe, RN  Outcome: Progressing     Problem: Safety - Adult  Goal: Free from fall injury  5/24/2024 1306 by Car Crespo, PARRISH  Outcome: Adequate for Discharge  5/24/2024 0131 by Lourdes Felipe, RN  Outcome: Progressing

## 2024-05-24 NOTE — PROGRESS NOTES
5/24/2024 1:18 PM  Antony Kee  [id number]    Subjective:    Awake and alert  Lying in bed  Kowalski catheter still present  Denies Kowalski catheter pain  States he is ready to go home    Review of Systems  Constitutional: No fever or chills   Respiratory: negative for cough and hemoptysis  Cardiovascular: negative for chest pain and dyspnea  Gastrointestinal: negative for abdominal pain, diarrhea, nausea and vomiting   : See above  Derm: negative for rash and skin lesion(s)  Neurological: negative for seizures and tremors  Musculoskeletal: Negative    Psychiatric: Negative   All other reviews are negative      Scheduled Meds:   baclofen  20 mg Oral Nightly    levothyroxine  25 mcg Oral Daily    sodium chloride flush  5-40 mL IntraVENous 2 times per day    ceFAZolin  1,000 mg IntraVENous Q8H    tamsulosin  0.4 mg Oral Daily       Objective:  Vitals:    05/24/24 1048   BP: 118/70   Pulse: 56   Resp: 16   Temp: 98 °F (36.7 °C)   SpO2: 100%         Allergies: Patient has no known allergies.    General Appearance: alert and oriented to person, place and time and in no acute distress  Skin: no rash or erythema  Head: normocephalic and atraumatic  Pulmonary/Chest: normal air movement, no respiratory distress  Abdomen: soft, non-tender, non-distended  Genitourinary: Kowalski catheter in place with clear yellow urine in tubing and bag  Extremities: no cyanosis, clubbing or edema         Labs:     No results for input(s): \"NA\", \"K\", \"CL\", \"CO2\", \"BUN\", \"CREATININE\", \"GLUCOSE\", \"CALCIUM\" in the last 72 hours.    Lab Results   Component Value Date/Time    HGB 12.9 05/24/2024 03:34 AM    HCT 39.4 05/24/2024 03:34 AM       No results found for: \"PSA\"      Assessment/Plan:  POD #1 s/p cystopanendoscopy, bilateral retrograde pyelogram, cystolitholapaxy, insertion of Kowalski, exam under anesthesia   Bladder stone    Kowalski catheter in place  Continue Kowalski catheter upon discharge, he will be sent home with a leg bag and instructed

## 2024-05-24 NOTE — ACP (ADVANCE CARE PLANNING)
Advance Care Planning   Healthcare Decision Maker:    Primary Decision Maker: BOBBY MACK - Ex-Spouse - [phone]    Click here to complete Healthcare Decision Makers including selection of the Healthcare Decision Maker Relationship (ie \"Primary\").

## 2024-05-24 NOTE — PROGRESS NOTES
P Quality Flow/Interdisciplinary Rounds Progress Note        Quality Flow Rounds held on May 24, 2024    Disciplines Attending:  Bedside Nurse, , , and Nursing Unit Leadership    Antony Kee was admitted on 5/23/2024  5:46 AM    Anticipated Discharge Date:       Disposition:    Ej Score:  Ej Scale Score: 20    Readmission Risk              Risk of Unplanned Readmission:  6           Discussed patient goal for the day, patient clinical progression, and barriers to discharge.  The following Goal(s) of the Day/Commitment(s) have been identified:   Discharge Planning      Shawnee Coughlin RN  May 24, 2024

## 2024-05-24 NOTE — CARE COORDINATION
Met with patient about diagnosis and discharge plan of care. POD#1 cysto/whaley cath insertion. Admit for pain control. Iv fluids. Pt lives alone in ranch home. Independent prior to admit. Has DME from previous ortho surgeries he does not use. PCP [place] Dr Puente in [place]. Plan is home with no needs. Will follow-o

## 2024-05-24 NOTE — PROGRESS NOTES
CLINICAL PHARMACY NOTE: MEDS TO BEDS    Total # of Prescriptions Filled: 4   The following medications were delivered to the patient:  Norco 5/325 mg   Cephalexin 500 mg   Phenazopyridine 200 mg   Ondansetron 4 mg     Additional Documentation:

## 2024-05-28 LAB
STONE COMPOSITION: NORMAL
STONE DESCRIPTION: NORMAL
STONE MASS: 1116 MG

## 2024-06-05 LAB — SURGICAL PATHOLOGY REPORT: NORMAL

## 2025-04-16 ENCOUNTER — APPOINTMENT (OUTPATIENT)
Dept: GENERAL RADIOLOGY | Age: 70
End: 2025-04-16
Payer: MEDICARE

## 2025-04-16 ENCOUNTER — APPOINTMENT (OUTPATIENT)
Dept: CT IMAGING | Age: 70
End: 2025-04-16
Payer: MEDICARE

## 2025-04-16 ENCOUNTER — HOSPITAL ENCOUNTER (EMERGENCY)
Age: 70
Discharge: HOME | End: 2025-04-16
Attending: EMERGENCY MEDICINE
Payer: MEDICARE

## 2025-04-16 VITALS
TEMPERATURE: 97.7 F | OXYGEN SATURATION: 100 % | RESPIRATION RATE: 18 BRPM | BODY MASS INDEX: 28.32 KG/M2 | HEIGHT: 65 IN | DIASTOLIC BLOOD PRESSURE: 90 MMHG | HEART RATE: 87 BPM | SYSTOLIC BLOOD PRESSURE: 150 MMHG | WEIGHT: 170 LBS

## 2025-04-16 DIAGNOSIS — M54.9 MID BACK PAIN: Primary | ICD-10-CM

## 2025-04-16 LAB
ALBUMIN SERPL-MCNC: 4.3 G/DL (ref 3.5–5.2)
ALP SERPL-CCNC: 89 U/L (ref 40–129)
ALT SERPL-CCNC: 18 U/L (ref 0–40)
ANION GAP SERPL CALCULATED.3IONS-SCNC: 10 MMOL/L (ref 7–16)
AST SERPL-CCNC: 16 U/L (ref 0–39)
BASOPHILS # BLD: 0.04 K/UL (ref 0–0.2)
BASOPHILS NFR BLD: 1 % (ref 0–2)
BILIRUB DIRECT SERPL-MCNC: <0.2 MG/DL (ref 0–0.3)
BILIRUB INDIRECT SERPL-MCNC: NORMAL MG/DL (ref 0–1)
BILIRUB SERPL-MCNC: 0.4 MG/DL (ref 0–1.2)
BILIRUB UR QL STRIP: NEGATIVE
BNP SERPL-MCNC: <36 PG/ML (ref 0–125)
BUN SERPL-MCNC: 13 MG/DL (ref 6–23)
CALCIUM SERPL-MCNC: 9.2 MG/DL (ref 8.6–10.2)
CHLORIDE SERPL-SCNC: 104 MMOL/L (ref 98–107)
CLARITY UR: CLEAR
CO2 SERPL-SCNC: 28 MMOL/L (ref 22–29)
COLOR UR: YELLOW
CREAT SERPL-MCNC: 0.9 MG/DL (ref 0.7–1.2)
CRYSTALS URNS MICRO: ABNORMAL /HPF
D-DIMER QUANTITATIVE: 251 NG/ML DDU (ref 0–230)
EKG ATRIAL RATE: 63 BPM
EKG P AXIS: 44 DEGREES
EKG P-R INTERVAL: 154 MS
EKG Q-T INTERVAL: 408 MS
EKG QRS DURATION: 86 MS
EKG QTC CALCULATION (BAZETT): 417 MS
EKG R AXIS: -5 DEGREES
EKG T AXIS: 31 DEGREES
EKG VENTRICULAR RATE: 63 BPM
EOSINOPHIL # BLD: 0.48 K/UL (ref 0.05–0.5)
EOSINOPHILS RELATIVE PERCENT: 8 % (ref 0–6)
ERYTHROCYTE [DISTWIDTH] IN BLOOD BY AUTOMATED COUNT: 14 % (ref 11.5–15)
GFR, ESTIMATED: >90 ML/MIN/1.73M2
GLUCOSE SERPL-MCNC: 91 MG/DL (ref 74–99)
GLUCOSE UR STRIP-MCNC: NEGATIVE MG/DL
HCT VFR BLD AUTO: 42.6 % (ref 37–54)
HGB BLD-MCNC: 14.2 G/DL (ref 12.5–16.5)
HGB UR QL STRIP.AUTO: NEGATIVE
IMM GRANULOCYTES # BLD AUTO: <0.03 K/UL (ref 0–0.58)
IMM GRANULOCYTES NFR BLD: 0 % (ref 0–5)
KETONES UR STRIP-MCNC: NEGATIVE MG/DL
LACTATE BLDV-SCNC: 0.7 MMOL/L (ref 0.5–2.2)
LEUKOCYTE ESTERASE UR QL STRIP: NEGATIVE
LIPASE SERPL-CCNC: 53 U/L (ref 13–60)
LYMPHOCYTES NFR BLD: 1.52 K/UL (ref 1.5–4)
LYMPHOCYTES RELATIVE PERCENT: 26 % (ref 20–42)
MAGNESIUM SERPL-MCNC: 2.2 MG/DL (ref 1.6–2.6)
MCH RBC QN AUTO: 29.7 PG (ref 26–35)
MCHC RBC AUTO-ENTMCNC: 33.3 G/DL (ref 32–34.5)
MCV RBC AUTO: 89.1 FL (ref 80–99.9)
MONOCYTES NFR BLD: 0.64 K/UL (ref 0.1–0.95)
MONOCYTES NFR BLD: 11 % (ref 2–12)
NEUTROPHILS NFR BLD: 55 % (ref 43–80)
NEUTS SEG NFR BLD: 3.22 K/UL (ref 1.8–7.3)
NITRITE UR QL STRIP: NEGATIVE
PH UR STRIP: 6 [PH] (ref 5–8)
PLATELET # BLD AUTO: 224 K/UL (ref 130–450)
PMV BLD AUTO: 10.2 FL (ref 7–12)
POTASSIUM SERPL-SCNC: 3.9 MMOL/L (ref 3.5–5)
PROT SERPL-MCNC: 6.7 G/DL (ref 6.4–8.3)
PROT UR STRIP-MCNC: NEGATIVE MG/DL
RBC # BLD AUTO: 4.78 M/UL (ref 3.8–5.8)
RBC #/AREA URNS HPF: ABNORMAL /HPF
SODIUM SERPL-SCNC: 142 MMOL/L (ref 132–146)
SP GR UR STRIP: 1.02 (ref 1–1.03)
TROPONIN I SERPL HS-MCNC: <6 NG/L (ref 0–22)
UROBILINOGEN UR STRIP-ACNC: 1 EU/DL (ref 0–1)
WBC #/AREA URNS HPF: ABNORMAL /HPF
WBC OTHER # BLD: 5.9 K/UL (ref 4.5–11.5)

## 2025-04-16 PROCEDURE — 85379 FIBRIN DEGRADATION QUANT: CPT

## 2025-04-16 PROCEDURE — 81001 URINALYSIS AUTO W/SCOPE: CPT

## 2025-04-16 PROCEDURE — 93005 ELECTROCARDIOGRAM TRACING: CPT | Performed by: EMERGENCY MEDICINE

## 2025-04-16 PROCEDURE — 71045 X-RAY EXAM CHEST 1 VIEW: CPT

## 2025-04-16 PROCEDURE — 82248 BILIRUBIN DIRECT: CPT

## 2025-04-16 PROCEDURE — 83880 ASSAY OF NATRIURETIC PEPTIDE: CPT

## 2025-04-16 PROCEDURE — 99285 EMERGENCY DEPT VISIT HI MDM: CPT

## 2025-04-16 PROCEDURE — 80053 COMPREHEN METABOLIC PANEL: CPT

## 2025-04-16 PROCEDURE — 6360000004 HC RX CONTRAST MEDICATION: Performed by: RADIOLOGY

## 2025-04-16 PROCEDURE — 96372 THER/PROPH/DIAG INJ SC/IM: CPT

## 2025-04-16 PROCEDURE — 83690 ASSAY OF LIPASE: CPT

## 2025-04-16 PROCEDURE — 96375 TX/PRO/DX INJ NEW DRUG ADDON: CPT

## 2025-04-16 PROCEDURE — 6370000000 HC RX 637 (ALT 250 FOR IP)

## 2025-04-16 PROCEDURE — 2580000003 HC RX 258: Performed by: EMERGENCY MEDICINE

## 2025-04-16 PROCEDURE — 96374 THER/PROPH/DIAG INJ IV PUSH: CPT

## 2025-04-16 PROCEDURE — 93010 ELECTROCARDIOGRAM REPORT: CPT | Performed by: INTERNAL MEDICINE

## 2025-04-16 PROCEDURE — 74177 CT ABD & PELVIS W/CONTRAST: CPT

## 2025-04-16 PROCEDURE — 85025 COMPLETE CBC W/AUTO DIFF WBC: CPT

## 2025-04-16 PROCEDURE — 84484 ASSAY OF TROPONIN QUANT: CPT

## 2025-04-16 PROCEDURE — 6360000002 HC RX W HCPCS: Performed by: EMERGENCY MEDICINE

## 2025-04-16 PROCEDURE — 83735 ASSAY OF MAGNESIUM: CPT

## 2025-04-16 PROCEDURE — 71275 CT ANGIOGRAPHY CHEST: CPT

## 2025-04-16 PROCEDURE — 83605 ASSAY OF LACTIC ACID: CPT

## 2025-04-16 RX ORDER — NAPROXEN 500 MG/1
500 TABLET ORAL 2 TIMES DAILY PRN
Qty: 14 TABLET | Refills: 0 | Status: SHIPPED | OUTPATIENT
Start: 2025-04-16 | End: 2025-04-23

## 2025-04-16 RX ORDER — ORPHENADRINE CITRATE 30 MG/ML
60 INJECTION INTRAMUSCULAR; INTRAVENOUS ONCE
Status: COMPLETED | OUTPATIENT
Start: 2025-04-16 | End: 2025-04-16

## 2025-04-16 RX ORDER — LIDOCAINE 4 G/G
PATCH TOPICAL
Status: DISCONTINUED
Start: 2025-04-16 | End: 2025-04-16 | Stop reason: HOSPADM

## 2025-04-16 RX ORDER — LIDOCAINE 50 MG/G
1 PATCH TOPICAL DAILY
Qty: 10 PATCH | Refills: 0 | Status: SHIPPED | OUTPATIENT
Start: 2025-04-16 | End: 2025-04-26

## 2025-04-16 RX ORDER — KETOROLAC TROMETHAMINE 15 MG/ML
15 INJECTION, SOLUTION INTRAMUSCULAR; INTRAVENOUS ONCE
Status: COMPLETED | OUTPATIENT
Start: 2025-04-16 | End: 2025-04-16

## 2025-04-16 RX ORDER — LIDOCAINE 4 G/G
1 PATCH TOPICAL ONCE
Status: DISCONTINUED | OUTPATIENT
Start: 2025-04-16 | End: 2025-04-16 | Stop reason: HOSPADM

## 2025-04-16 RX ORDER — 0.9 % SODIUM CHLORIDE 0.9 %
1000 INTRAVENOUS SOLUTION INTRAVENOUS ONCE
Status: COMPLETED | OUTPATIENT
Start: 2025-04-16 | End: 2025-04-16

## 2025-04-16 RX ORDER — IOPAMIDOL 755 MG/ML
75 INJECTION, SOLUTION INTRAVASCULAR
Status: COMPLETED | OUTPATIENT
Start: 2025-04-16 | End: 2025-04-16

## 2025-04-16 RX ORDER — ONDANSETRON 2 MG/ML
4 INJECTION INTRAMUSCULAR; INTRAVENOUS ONCE
Status: COMPLETED | OUTPATIENT
Start: 2025-04-16 | End: 2025-04-16

## 2025-04-16 RX ADMIN — ONDANSETRON 4 MG: 2 INJECTION, SOLUTION INTRAMUSCULAR; INTRAVENOUS at 11:03

## 2025-04-16 RX ADMIN — KETOROLAC TROMETHAMINE 15 MG: 15 INJECTION, SOLUTION INTRAMUSCULAR; INTRAVENOUS at 11:04

## 2025-04-16 RX ADMIN — SODIUM CHLORIDE 1000 ML: 0.9 INJECTION, SOLUTION INTRAVENOUS at 11:02

## 2025-04-16 RX ADMIN — IOPAMIDOL 75 ML: 755 INJECTION, SOLUTION INTRAVENOUS at 12:03

## 2025-04-16 RX ADMIN — ORPHENADRINE CITRATE 60 MG: 60 INJECTION INTRAMUSCULAR; INTRAVENOUS at 13:08

## 2025-04-16 ASSESSMENT — PAIN DESCRIPTION - ORIENTATION
ORIENTATION: RIGHT

## 2025-04-16 ASSESSMENT — LIFESTYLE VARIABLES
HOW OFTEN DO YOU HAVE A DRINK CONTAINING ALCOHOL: 4 OR MORE TIMES A WEEK
HOW MANY STANDARD DRINKS CONTAINING ALCOHOL DO YOU HAVE ON A TYPICAL DAY: 1 OR 2

## 2025-04-16 ASSESSMENT — PAIN DESCRIPTION - LOCATION
LOCATION: FLANK

## 2025-04-16 ASSESSMENT — PAIN SCALES - GENERAL
PAINLEVEL_OUTOF10: 8
PAINLEVEL_OUTOF10: 8
PAINLEVEL_OUTOF10: 5
PAINLEVEL_OUTOF10: 8
PAINLEVEL_OUTOF10: 8

## 2025-04-16 ASSESSMENT — PAIN - FUNCTIONAL ASSESSMENT
PAIN_FUNCTIONAL_ASSESSMENT: ACTIVITIES ARE NOT PREVENTED
PAIN_FUNCTIONAL_ASSESSMENT: PREVENTS OR INTERFERES SOME ACTIVE ACTIVITIES AND ADLS

## 2025-04-16 ASSESSMENT — PAIN DESCRIPTION - DESCRIPTORS
DESCRIPTORS: STABBING
DESCRIPTORS: SHARP

## 2025-04-16 ASSESSMENT — PAIN DESCRIPTION - FREQUENCY
FREQUENCY: INTERMITTENT
FREQUENCY: INTERMITTENT

## 2025-04-16 NOTE — ED PROVIDER NOTES
[facility] EMERGENCY DEPARTMENT  EMERGENCY DEPARTMENT ENCOUNTER        Pt Name: Antony Kee  MRN: [id number]  Birthdate 1955  Date of evaluation: 4/16/2025  Provider: Alla Chakraborty DO  PCP: Austen Puente MD  Note Started: 10:07 AM EDT 4/16/25    CHIEF COMPLAINT       Chief Complaint   Patient presents with    Flank Pain     Right sided, hx of kidney stones, intermittent pain, started three weeks ago, was seen at [facility] had ct scan    Nausea       HISTORY OF PRESENT ILLNESS: 1 or more Elements   History From: Patient    Limitations to history : None    Antony Kee is a 69 y.o. male who presents with concern for right-sided flank pain.  Notes that his ongoing for the past 3 weeks, it it was initially intermittent but has been constant over the past week.  Notes he has seen urology to have a bladder stone removed and was told that he did have a stone in his right kidney but is nonradiating.  He has not been having decreased urination or dysuria, no frequency, no fevers but has been feeling intermittently chilled and nauseous.  No other associated complaints.      EXTERNAL NOTE REVIEW:      On chart review last saw urology for bladder stone removal on 5/23/2024    REVIEW OF SYSTEMS :      Positives and Pertinent negatives as per HPI.     SURGICAL HISTORY     Past Surgical History:   Procedure Laterality Date    COLONOSCOPY  2016    CYSTOSCOPY      CYSTOSCOPY N/A 5/23/2024    CYSTOSCOPY BILATERAL RETROGRADE PYELOGRAM CYSTOLITHOLAPAXY, HEBERT INSERTION performed by Hudson Thrasher MD at [facility] OR    HIP FRACTURE SURGERY Right 2020    KNEE ARTHROSCOPY Right 2022    WRIST SURGERY         CURRENTMEDICATIONS       Previous Medications    ALFUZOSIN (UROXATRAL) 10 MG EXTENDED RELEASE TABLET    Take 1 tablet by mouth at bedtime    ASPIRIN 325 MG TABLET    Take 1 tablet by mouth daily    BACLOFEN (LIORESAL) 20 MG TABLET    Take 1 tablet by mouth nightly    LEVOTHYROXINE

## 2025-04-16 NOTE — ED NOTES
Updated patient on plan of care. Patient reporting pain still after medication. All questions answered at this time.   Pending CT scans at this time.

## (undated) DEVICE — BLADDER EVACUATOR: Brand: UROVAC BLADDER EVACUATOR

## (undated) DEVICE — GAUZE,SPONGE,4"X4",8PLY,STRL,LF,10/TRAY: Brand: MEDLINE

## (undated) DEVICE — DRAINBAG,ANTI-REFLUX TOWER,L/F,2000ML,LL: Brand: MEDLINE

## (undated) DEVICE — SOLUTION IRRIG 3000ML STRL H2O USP UROMATIC PLAS CONT

## (undated) DEVICE — CONE TIP URETERAL CATHETER: Brand: URETERAL CATHETER

## (undated) DEVICE — SOLUTION SCRB 4OZ 4% CHG H2O AIDED FOR PREOPERATIVE SKIN

## (undated) DEVICE — ELECTRODE PT RET AD L9FT HI MOIST COND ADH HYDRGEL CORDED

## (undated) DEVICE — Device

## (undated) DEVICE — SOLUTION IRRIG 1000ML STRL H2O USP PLAS POUR BTL

## (undated) DEVICE — SPECIMEN CUP W/LID: Brand: DEROYAL

## (undated) DEVICE — TUBING, SUCTION, 3/16" X 12', STRAIGHT: Brand: MEDLINE

## (undated) DEVICE — SOLUTION IRRIG 3000ML 1.5% GL USP UROMATIC CONT

## (undated) DEVICE — PAD,NON-ADHERENT,2X3,STERILE,LF,1/PK: Brand: MEDLINE

## (undated) DEVICE — SOLUTION IRRIG 3000ML 0.9% SOD CHL USP UROMATIC PLAS CONT

## (undated) DEVICE — GOWN,SIRUS,FABRNF,2XL,18/CS: Brand: MEDLINE

## (undated) DEVICE — CYSTO PACK: Brand: MEDLINE INDUSTRIES, INC.

## (undated) DEVICE — GLOVE ORANGE PI 7 1/2   MSG9075

## (undated) DEVICE — BAG DRNGE COMB PK

## (undated) DEVICE — HOSE CONN FOR WST MGMT SYS NEPTUNE 2

## (undated) DEVICE — 4-PORT MANIFOLD: Brand: NEPTUNE 2

## (undated) DEVICE — CATHETER URETH 22FR BLLN 5CC STD LTX 3 W F TWO OPP DRNGE